# Patient Record
Sex: FEMALE | Race: WHITE | NOT HISPANIC OR LATINO | Employment: FULL TIME | URBAN - METROPOLITAN AREA
[De-identification: names, ages, dates, MRNs, and addresses within clinical notes are randomized per-mention and may not be internally consistent; named-entity substitution may affect disease eponyms.]

---

## 2024-04-21 ENCOUNTER — HOSPITAL ENCOUNTER (EMERGENCY)
Facility: HOSPITAL | Age: 49
Discharge: HOME | End: 2024-04-21
Payer: COMMERCIAL

## 2024-04-21 ENCOUNTER — APPOINTMENT (OUTPATIENT)
Dept: RADIOLOGY | Facility: HOSPITAL | Age: 49
End: 2024-04-21
Payer: COMMERCIAL

## 2024-04-21 VITALS
BODY MASS INDEX: 50.02 KG/M2 | HEART RATE: 85 BPM | HEIGHT: 64 IN | OXYGEN SATURATION: 99 % | RESPIRATION RATE: 18 BRPM | TEMPERATURE: 98.6 F | DIASTOLIC BLOOD PRESSURE: 82 MMHG | WEIGHT: 293 LBS | SYSTOLIC BLOOD PRESSURE: 126 MMHG

## 2024-04-21 DIAGNOSIS — M25.562 ACUTE PAIN OF LEFT KNEE: Primary | ICD-10-CM

## 2024-04-21 PROCEDURE — 99283 EMERGENCY DEPT VISIT LOW MDM: CPT

## 2024-04-21 PROCEDURE — 73564 X-RAY EXAM KNEE 4 OR MORE: CPT | Mod: LT

## 2024-04-21 PROCEDURE — 73564 X-RAY EXAM KNEE 4 OR MORE: CPT | Mod: LEFT SIDE | Performed by: STUDENT IN AN ORGANIZED HEALTH CARE EDUCATION/TRAINING PROGRAM

## 2024-04-21 PROCEDURE — 2500000001 HC RX 250 WO HCPCS SELF ADMINISTERED DRUGS (ALT 637 FOR MEDICARE OP): Performed by: NURSE PRACTITIONER

## 2024-04-21 RX ORDER — KETOROLAC TROMETHAMINE 10 MG/1
10 TABLET, FILM COATED ORAL EVERY 6 HOURS PRN
Qty: 20 TABLET | Refills: 0 | Status: SHIPPED | OUTPATIENT
Start: 2024-04-21 | End: 2024-04-26

## 2024-04-21 RX ORDER — HYDROCODONE BITARTRATE AND ACETAMINOPHEN 5; 325 MG/1; MG/1
1 TABLET ORAL ONCE
Status: COMPLETED | OUTPATIENT
Start: 2024-04-21 | End: 2024-04-21

## 2024-04-21 RX ADMIN — HYDROCODONE BITARTRATE AND ACETAMINOPHEN 1 TABLET: 5; 325 TABLET ORAL at 21:43

## 2024-04-21 ASSESSMENT — PAIN SCALES - GENERAL
PAINLEVEL_OUTOF10: 8
PAINLEVEL_OUTOF10: 8

## 2024-04-21 ASSESSMENT — COLUMBIA-SUICIDE SEVERITY RATING SCALE - C-SSRS
1. IN THE PAST MONTH, HAVE YOU WISHED YOU WERE DEAD OR WISHED YOU COULD GO TO SLEEP AND NOT WAKE UP?: NO
6. HAVE YOU EVER DONE ANYTHING, STARTED TO DO ANYTHING, OR PREPARED TO DO ANYTHING TO END YOUR LIFE?: NO
2. HAVE YOU ACTUALLY HAD ANY THOUGHTS OF KILLING YOURSELF?: NO

## 2024-04-21 ASSESSMENT — PAIN DESCRIPTION - LOCATION: LOCATION: KNEE

## 2024-04-21 ASSESSMENT — PAIN - FUNCTIONAL ASSESSMENT
PAIN_FUNCTIONAL_ASSESSMENT: 0-10
PAIN_FUNCTIONAL_ASSESSMENT: 0-10

## 2024-04-22 NOTE — ED PROVIDER NOTES
HPI   Chief Complaint   Patient presents with    Knee Pain       This is a 49-year-old  female presenting to the emergency room with complaints of left knee pain.  The patient reports that she has had previous knee problems.  She was told she was bone-on-bone in both knees.  Her knees frequently give out.  The patient was walking downstairs yesterday and her right knee gave out.  She slammed down on her left leg trying to catch herself.  She is experiencing pain mostly in the medial tibial plateau region.  She states that it shoots straight through to the posterior aspect.  She reports swelling along the medial joint line.  She has pain with extension and flexion of the knee.  It is very hard for her to bear weight.  No calf pain.  No paresthesias.  She took Motrin this morning with no relief of her pain symptoms.  She is under the care of comprehensive pain management but is currently not on any pain medications.                          [place] Coma Scale Score: 15                     Patient History   Past Medical History:   Diagnosis Date    Anxiety     Depression      History reviewed. No pertinent surgical history.  No family history on file.  Social History     Tobacco Use    Smoking status: Never    Smokeless tobacco: Never   Substance Use Topics    Alcohol use: Not Currently     Comment: occassionally    Drug use: Never       Physical Exam   ED Triage Vitals [04/21/24 2121]   Temperature Heart Rate Respirations BP   37 °C (98.6 °F) 85 18 126/82      Pulse Ox Temp Source Heart Rate Source Patient Position   99 % Temporal Monitor --      BP Location FiO2 (%)     -- --       Physical Exam    ED Course & MDM   Diagnoses as of 04/21/24 2225   Acute pain of left knee       Medical Decision Making  The patient was seen and evaluated by the nurse practitioner, Denita Pena.  The patient is presenting to the emergency room with acute on chronic left knee pain.  Differential diagnosis includes knee strain,  knee dislocation, fracture, ligamentous injury, or other acute process.  The patient does not have any obvious signs of a septic joint.  An x-ray of the knee was performed and showed moderate knee joint arthropathy with no acute evidence of fracture or dislocation.  Patient was medicated with 1 Norco tab p.o. in the emergency room.  An Ace wrap was placed on the knee by nursing staff.  She is neurovascularly intact after Ace wrap placement.  She was educated on rice therapy and is to follow-up with orthopedic specialist.  Patient was provided prescription for ketorolac for home administration.  The patient was discharged in stable condition with computer discharge instructions given.  She is to return if worse in any way.        Procedure  Procedures     Denita Pena, SHAMIKA-VERONIQUE  04/21/24 2629

## 2024-04-22 NOTE — ED TRIAGE NOTES
"Pt to ED from home c/o L knee pain. Pt states both knees \"bone on bone\", has had multiple knee surgeries in the past. Pt states was walking down steps when she missed one, landed on L foot hard. Pt states normally about 90% non-weight bearing due to pain bilateral knees, pt states utilized crutches at home to ambulate. Pt denies hitting the floor during fall, only c/o L knee pain.   "

## 2024-05-28 ENCOUNTER — HOSPITAL ENCOUNTER (EMERGENCY)
Facility: HOSPITAL | Age: 49
Discharge: HOME | End: 2024-05-28
Payer: COMMERCIAL

## 2024-05-28 VITALS
BODY MASS INDEX: 50.02 KG/M2 | DIASTOLIC BLOOD PRESSURE: 83 MMHG | HEIGHT: 64 IN | TEMPERATURE: 98.1 F | WEIGHT: 293 LBS | HEART RATE: 98 BPM | RESPIRATION RATE: 18 BRPM | OXYGEN SATURATION: 100 % | SYSTOLIC BLOOD PRESSURE: 132 MMHG

## 2024-05-28 DIAGNOSIS — F41.0 PANIC ATTACK: Primary | ICD-10-CM

## 2024-05-28 PROCEDURE — 2500000001 HC RX 250 WO HCPCS SELF ADMINISTERED DRUGS (ALT 637 FOR MEDICARE OP): Performed by: NURSE PRACTITIONER

## 2024-05-28 PROCEDURE — 99283 EMERGENCY DEPT VISIT LOW MDM: CPT

## 2024-05-28 RX ORDER — ALPRAZOLAM 0.5 MG/1
0.5 TABLET ORAL ONCE
Status: COMPLETED | OUTPATIENT
Start: 2024-05-28 | End: 2024-05-28

## 2024-05-28 RX ADMIN — ALPRAZOLAM 0.5 MG: 0.5 TABLET ORAL at 12:14

## 2024-05-28 ASSESSMENT — COLUMBIA-SUICIDE SEVERITY RATING SCALE - C-SSRS
2. HAVE YOU ACTUALLY HAD ANY THOUGHTS OF KILLING YOURSELF?: NO
6. HAVE YOU EVER DONE ANYTHING, STARTED TO DO ANYTHING, OR PREPARED TO DO ANYTHING TO END YOUR LIFE?: NO
1. IN THE PAST MONTH, HAVE YOU WISHED YOU WERE DEAD OR WISHED YOU COULD GO TO SLEEP AND NOT WAKE UP?: NO

## 2024-05-28 ASSESSMENT — PAIN DESCRIPTION - LOCATION: LOCATION: HEAD

## 2024-05-28 ASSESSMENT — PAIN - FUNCTIONAL ASSESSMENT: PAIN_FUNCTIONAL_ASSESSMENT: 0-10

## 2024-05-28 ASSESSMENT — PAIN SCALES - GENERAL: PAINLEVEL_OUTOF10: 4

## 2024-05-28 NOTE — ED PROVIDER NOTES
Chief Complaint   Patient presents with   • Hyperventilating     Pt was driving and had a panic attack. Pt has hands cramping and face tingling. Pt called ems. Pt states she currently has a lot of stress.        HPI       49 year old female presents to the Emergency Department today complaining of having an anxiety attack while driving this am. Notes that she pulled over to the side of the road and called EMS. Was noted to have carpal pedal spasms secondary to such. Denies any associated fever, chills, headache, neck pain, chest pain, shortness of breath, abdominal pain, nausea, vomiting, diarrhea, constipation, or urinary symptoms.       History provided by:  Patient             Patient History   Past Medical History:   Diagnosis Date   • Anxiety    • Depression      No past surgical history on file.  No family history on file.  Social History     Tobacco Use   • Smoking status: Never   • Smokeless tobacco: Never   Substance Use Topics   • Alcohol use: Not Currently     Comment: occassionally   • Drug use: Never           Physical Exam  Constitutional:       Appearance: Normal appearance.   HENT:      Head: Normocephalic.      Right Ear: Tympanic membrane, ear canal and external ear normal.      Left Ear: Tympanic membrane, ear canal and external ear normal.      Nose: Nose normal.      Mouth/Throat:      Mouth: Mucous membranes are moist.      Pharynx: Oropharynx is clear. No oropharyngeal exudate or posterior oropharyngeal erythema.   Eyes:      Conjunctiva/sclera: Conjunctivae normal.      Pupils: Pupils are equal, round, and reactive to light.   Cardiovascular:      Rate and Rhythm: Normal rate and regular rhythm.      Pulses:           Radial pulses are 3+ on the right side and 3+ on the left side.        Dorsalis pedis pulses are 3+ on the right side and 3+ on the left side.      Heart sounds: Normal heart sounds. No murmur heard.     No friction rub. No gallop.   Pulmonary:      Effort: Pulmonary effort is  normal. No respiratory distress.      Breath sounds: Normal breath sounds. No wheezing, rhonchi or rales.   Abdominal:      General: Abdomen is flat. Bowel sounds are normal.      Palpations: Abdomen is soft.      Tenderness: There is no abdominal tenderness. There is no right CVA tenderness, left CVA tenderness, guarding or rebound. Negative signs include Grover's sign and McBurney's sign.   Musculoskeletal:         General: No swelling or deformity.      Cervical back: Full passive range of motion without pain.      Right lower leg: No edema.      Left lower leg: No edema.      Comments: Carpal pedal spasms present.    Lymphadenopathy:      Cervical: No cervical adenopathy.   Skin:     Capillary Refill: Capillary refill takes less than 2 seconds.      Coloration: Skin is not jaundiced.      Findings: No rash.   Neurological:      General: No focal deficit present.      Mental Status: She is alert and oriented to person, place, and time. Mental status is at baseline.      Gait: Gait is intact.   Psychiatric:         Mood and Affect: Mood normal.         Behavior: Behavior is cooperative.         Labs Reviewed - No data to display    No orders to display            ED Course & MDM   Diagnoses as of 05/28/24 1307   Panic attack           Medical Decision Making  Patient was seen and evaluated by myself. Placed on a non-rebreather with minimal oxygen. Gave her a dose of her Xanax as her home dose. She was monitored for a small amount of time in the ED with resolution of her symptoms. Notes that she has been under a great deal of stress as of late, which is likely the cause of her panic attack.  Follow up with their doctor in 3 days. Return if worse in any way. Discharged in stable condition with computer instructions.    Diagnostic Impression:     1. Acute panic attack           Your medication list      You have not been prescribed any medications.           Procedure  Procedures     Willie Chao,  SHAMIKA-CNP  05/28/24 1689

## 2025-01-03 ENCOUNTER — APPOINTMENT (OUTPATIENT)
Dept: RADIOLOGY | Facility: HOSPITAL | Age: 50
End: 2025-01-03
Payer: COMMERCIAL

## 2025-01-03 ENCOUNTER — HOSPITAL ENCOUNTER (EMERGENCY)
Facility: HOSPITAL | Age: 50
Discharge: HOME | End: 2025-01-03
Attending: EMERGENCY MEDICINE
Payer: COMMERCIAL

## 2025-01-03 VITALS
BODY MASS INDEX: 50.02 KG/M2 | WEIGHT: 293 LBS | SYSTOLIC BLOOD PRESSURE: 135 MMHG | HEART RATE: 96 BPM | RESPIRATION RATE: 18 BRPM | OXYGEN SATURATION: 96 % | TEMPERATURE: 97 F | HEIGHT: 64 IN | DIASTOLIC BLOOD PRESSURE: 90 MMHG

## 2025-01-03 DIAGNOSIS — K52.9 ENTEROCOLITIS: Primary | ICD-10-CM

## 2025-01-03 LAB
ALBUMIN SERPL BCP-MCNC: 4.5 G/DL (ref 3.4–5)
ALP SERPL-CCNC: 148 U/L (ref 33–110)
ALT SERPL W P-5'-P-CCNC: 83 U/L (ref 7–45)
ANION GAP SERPL CALC-SCNC: 13 MMOL/L (ref 10–20)
AST SERPL W P-5'-P-CCNC: 68 U/L (ref 9–39)
BASOPHILS # BLD AUTO: 0.02 X10*3/UL (ref 0–0.1)
BASOPHILS NFR BLD AUTO: 0.2 %
BILIRUB SERPL-MCNC: 0.8 MG/DL (ref 0–1.2)
BUN SERPL-MCNC: 18 MG/DL (ref 6–23)
CALCIUM SERPL-MCNC: 8.8 MG/DL (ref 8.6–10.3)
CHLORIDE SERPL-SCNC: 105 MMOL/L (ref 98–107)
CO2 SERPL-SCNC: 24 MMOL/L (ref 21–32)
CREAT SERPL-MCNC: 0.83 MG/DL (ref 0.5–1.05)
EGFRCR SERPLBLD CKD-EPI 2021: 85 ML/MIN/1.73M*2
EOSINOPHIL # BLD AUTO: 0.07 X10*3/UL (ref 0–0.7)
EOSINOPHIL NFR BLD AUTO: 0.8 %
ERYTHROCYTE [DISTWIDTH] IN BLOOD BY AUTOMATED COUNT: 14.7 % (ref 11.5–14.5)
GLUCOSE SERPL-MCNC: 132 MG/DL (ref 74–99)
HCT VFR BLD AUTO: 40.6 % (ref 36–46)
HGB BLD-MCNC: 12.7 G/DL (ref 12–16)
IMM GRANULOCYTES # BLD AUTO: 0.04 X10*3/UL (ref 0–0.7)
IMM GRANULOCYTES NFR BLD AUTO: 0.4 % (ref 0–0.9)
LACTATE SERPL-SCNC: 2.3 MMOL/L (ref 0.4–2)
LYMPHOCYTES # BLD AUTO: 0.65 X10*3/UL (ref 1.2–4.8)
LYMPHOCYTES NFR BLD AUTO: 7.1 %
MCH RBC QN AUTO: 26.9 PG (ref 26–34)
MCHC RBC AUTO-ENTMCNC: 31.3 G/DL (ref 32–36)
MCV RBC AUTO: 86 FL (ref 80–100)
MONOCYTES # BLD AUTO: 0.43 X10*3/UL (ref 0.1–1)
MONOCYTES NFR BLD AUTO: 4.7 %
NEUTROPHILS # BLD AUTO: 7.9 X10*3/UL (ref 1.2–7.7)
NEUTROPHILS NFR BLD AUTO: 86.8 %
NRBC BLD-RTO: 0 /100 WBCS (ref 0–0)
PLATELET # BLD AUTO: 215 X10*3/UL (ref 150–450)
POTASSIUM SERPL-SCNC: 3.5 MMOL/L (ref 3.5–5.3)
PROT SERPL-MCNC: 7.6 G/DL (ref 6.4–8.2)
RBC # BLD AUTO: 4.72 X10*6/UL (ref 4–5.2)
SODIUM SERPL-SCNC: 138 MMOL/L (ref 136–145)
WBC # BLD AUTO: 9.1 X10*3/UL (ref 4.4–11.3)

## 2025-01-03 PROCEDURE — 2500000004 HC RX 250 GENERAL PHARMACY W/ HCPCS (ALT 636 FOR OP/ED)

## 2025-01-03 PROCEDURE — 85025 COMPLETE CBC W/AUTO DIFF WBC: CPT | Performed by: EMERGENCY MEDICINE

## 2025-01-03 PROCEDURE — 96374 THER/PROPH/DIAG INJ IV PUSH: CPT | Mod: 59

## 2025-01-03 PROCEDURE — 80053 COMPREHEN METABOLIC PANEL: CPT | Performed by: EMERGENCY MEDICINE

## 2025-01-03 PROCEDURE — 74177 CT ABD & PELVIS W/CONTRAST: CPT

## 2025-01-03 PROCEDURE — 2550000001 HC RX 255 CONTRASTS: Performed by: EMERGENCY MEDICINE

## 2025-01-03 PROCEDURE — 2500000004 HC RX 250 GENERAL PHARMACY W/ HCPCS (ALT 636 FOR OP/ED): Performed by: EMERGENCY MEDICINE

## 2025-01-03 PROCEDURE — 96375 TX/PRO/DX INJ NEW DRUG ADDON: CPT

## 2025-01-03 PROCEDURE — 36415 COLL VENOUS BLD VENIPUNCTURE: CPT | Performed by: EMERGENCY MEDICINE

## 2025-01-03 PROCEDURE — 99285 EMERGENCY DEPT VISIT HI MDM: CPT | Mod: 25 | Performed by: EMERGENCY MEDICINE

## 2025-01-03 PROCEDURE — 96361 HYDRATE IV INFUSION ADD-ON: CPT

## 2025-01-03 PROCEDURE — 74177 CT ABD & PELVIS W/CONTRAST: CPT | Performed by: RADIOLOGY

## 2025-01-03 PROCEDURE — 83605 ASSAY OF LACTIC ACID: CPT | Performed by: EMERGENCY MEDICINE

## 2025-01-03 RX ORDER — ONDANSETRON HYDROCHLORIDE 2 MG/ML
4 INJECTION, SOLUTION INTRAVENOUS ONCE
Status: COMPLETED | OUTPATIENT
Start: 2025-01-03 | End: 2025-01-03

## 2025-01-03 RX ORDER — ONDANSETRON HYDROCHLORIDE 2 MG/ML
INJECTION, SOLUTION INTRAVENOUS
Status: COMPLETED
Start: 2025-01-03 | End: 2025-01-03

## 2025-01-03 RX ORDER — ONDANSETRON 4 MG/1
4 TABLET, ORALLY DISINTEGRATING ORAL EVERY 8 HOURS PRN
Qty: 20 TABLET | Refills: 0 | Status: SHIPPED | OUTPATIENT
Start: 2025-01-03 | End: 2025-01-10

## 2025-01-03 RX ORDER — MORPHINE SULFATE 4 MG/ML
INJECTION INTRAVENOUS
Status: COMPLETED
Start: 2025-01-03 | End: 2025-01-03

## 2025-01-03 RX ORDER — DICYCLOMINE HYDROCHLORIDE 20 MG/1
20 TABLET ORAL 4 TIMES DAILY PRN
Qty: 28 TABLET | Refills: 0 | Status: SHIPPED | OUTPATIENT
Start: 2025-01-03 | End: 2025-01-10

## 2025-01-03 RX ORDER — MORPHINE SULFATE 4 MG/ML
4 INJECTION INTRAVENOUS ONCE
Status: COMPLETED | OUTPATIENT
Start: 2025-01-03 | End: 2025-01-03

## 2025-01-03 RX ADMIN — ONDANSETRON HYDROCHLORIDE 4 MG: 2 INJECTION, SOLUTION INTRAVENOUS at 04:31

## 2025-01-03 RX ADMIN — MORPHINE SULFATE 4 MG: 4 INJECTION INTRAVENOUS at 04:31

## 2025-01-03 RX ADMIN — ONDANSETRON 4 MG: 2 INJECTION INTRAMUSCULAR; INTRAVENOUS at 04:31

## 2025-01-03 RX ADMIN — SODIUM CHLORIDE 1000 ML: 9 INJECTION, SOLUTION INTRAVENOUS at 04:31

## 2025-01-03 RX ADMIN — IOHEXOL 75 ML: 350 INJECTION, SOLUTION INTRAVENOUS at 05:25

## 2025-01-03 ASSESSMENT — PAIN SCALES - GENERAL
PAINLEVEL_OUTOF10: 8
PAINLEVEL_OUTOF10: 4

## 2025-01-03 ASSESSMENT — PAIN DESCRIPTION - LOCATION: LOCATION: ABDOMEN

## 2025-01-03 ASSESSMENT — COLUMBIA-SUICIDE SEVERITY RATING SCALE - C-SSRS
1. IN THE PAST MONTH, HAVE YOU WISHED YOU WERE DEAD OR WISHED YOU COULD GO TO SLEEP AND NOT WAKE UP?: NO
2. HAVE YOU ACTUALLY HAD ANY THOUGHTS OF KILLING YOURSELF?: NO
6. HAVE YOU EVER DONE ANYTHING, STARTED TO DO ANYTHING, OR PREPARED TO DO ANYTHING TO END YOUR LIFE?: NO

## 2025-01-03 ASSESSMENT — PAIN - FUNCTIONAL ASSESSMENT
PAIN_FUNCTIONAL_ASSESSMENT: 0-10
PAIN_FUNCTIONAL_ASSESSMENT: 0-10

## 2025-01-03 ASSESSMENT — PAIN DESCRIPTION - PAIN TYPE: TYPE: ACUTE PAIN

## 2025-01-03 NOTE — ED PROVIDER NOTES
HPI   Chief Complaint   Patient presents with    Abdominal Pain     Pt c/o abd pain, nausea and vomiting x a few hours       Patient presents emergency department secondary to abdominal pain nausea and vomiting.  This started a few hours ago.  She also is having diarrhea.  No hematemesis or hematochezia.  Pain is more localized in the upper abdomen but really diffuse over the whole stomach.  No sick contacts.  No bad food exposures.  No history of pain like this in the past.  Patient has not had any abdominal surgeries.  No fever.  She has had chills.  No chest pain or shortness of breath.  No cough or congestion.  No other complaints at this time.                          [place] Coma Scale Score: 15                  Patient History   Past Medical History:   Diagnosis Date    Anxiety     Depression      History reviewed. No pertinent surgical history.  No family history on file.  Social History     Tobacco Use    Smoking status: Never    Smokeless tobacco: Never   Vaping Use    Vaping status: Never Used   Substance Use Topics    Alcohol use: Not Currently     Comment: occassionally    Drug use: Never       Physical Exam   ED Triage Vitals [01/03/25 0326]   Temperature Heart Rate Respirations BP   36.1 °C (97 °F) 97 (!) 24 (!) 144/94      Pulse Ox Temp Source Heart Rate Source Patient Position   96 % Skin Monitor --      BP Location FiO2 (%)     -- --       Physical Exam  Vitals and nursing note reviewed.   Constitutional:       Appearance: Normal appearance. She is well-developed. She is ill-appearing.   HENT:      Head: Normocephalic and atraumatic.      Right Ear: Tympanic membrane normal.      Left Ear: Tympanic membrane normal.      Nose: Nose normal.      Mouth/Throat:      Mouth: Mucous membranes are moist.   Eyes:      Extraocular Movements: Extraocular movements intact.      Pupils: Pupils are equal, round, and reactive to light.   Cardiovascular:      Rate and Rhythm: Normal rate and regular rhythm.       Pulses: Normal pulses.      Heart sounds: Normal heart sounds.   Pulmonary:      Effort: Pulmonary effort is normal.      Breath sounds: Normal breath sounds. No wheezing or rales.   Chest:      Chest wall: No tenderness.   Abdominal:      General: Abdomen is flat. Bowel sounds are normal.      Palpations: Abdomen is soft.      Tenderness: There is generalized abdominal tenderness.      Hernia: No hernia is present.      Comments: Voluntary guarding in the upper abdomen.   Musculoskeletal:         General: Normal range of motion.   Skin:     General: Skin is warm and dry.      Capillary Refill: Capillary refill takes less than 2 seconds.   Neurological:      General: No focal deficit present.      Mental Status: She is alert and oriented to person, place, and time.   Psychiatric:         Mood and Affect: Mood normal.         Behavior: Behavior normal.       Labs Reviewed   URINALYSIS WITH REFLEX CULTURE AND MICROSCOPIC    Narrative:     The following orders were created for panel order Urinalysis with Reflex Culture and Microscopic.  Procedure                               Abnormality         Status                     ---------                               -----------         ------                     Urinalysis with Reflex C...[[id number]]                                                 Extra Urine Gray Tube[[id number]]                                                         Please view results for these tests on the individual orders.   CBC WITH AUTO DIFFERENTIAL   COMPREHENSIVE METABOLIC PANEL   LACTATE   URINALYSIS WITH REFLEX CULTURE AND MICROSCOPIC   EXTRA URINE GRAY TUBE     Pain Management Panel           No data to display              CT abdomen pelvis w IV contrast    (Results Pending)     ED Course & MDM   Diagnoses as of 01/03/25 0627   Enterocolitis       Medical Decision Making  Patient presents to the emergency department secondary to abdominal pain nausea vomiting and diarrhea.  Differential  diagnosis for this patient is gastroenteritis, colitis, appendicitis, other acute intra-abdominal process.  Patient is evaluated in the emergency department CBC CMP and urinalysis.  Lactate is obtained.  Patient is given morphine 4 mg and Zofran 4 mg for pain and nausea.  She is given 1 L normal saline IV fluid bolus.  CT abdomen pelvis with IV contrast is obtained.  Laboratory workup overall is unremarkable.  Very minimal elevation in liver enzymes.  No evidence of leukocytosis or acute kidney injury.  Lactate was mildly elevated at 2.3.  Patient feels improved after treatment.  CT abdomen pelvis shows evidence of intra colitis.  Patient is prescribed Bentyl and Zofran to use for symptomatic treatment at home.  She is to return for new or worsening symptoms.  She is stable for discharge at this time.        Procedure  Procedures     Susy Francois MD  01/03/25 0171

## 2025-02-25 ENCOUNTER — HOSPITAL ENCOUNTER (OUTPATIENT)
Dept: RADIOLOGY | Facility: CLINIC | Age: 50
Discharge: HOME | End: 2025-02-25
Payer: COMMERCIAL

## 2025-02-25 ENCOUNTER — OFFICE VISIT (OUTPATIENT)
Dept: ORTHOPEDIC SURGERY | Facility: CLINIC | Age: 50
End: 2025-02-25
Payer: COMMERCIAL

## 2025-02-25 VITALS — BODY MASS INDEX: 49.51 KG/M2 | WEIGHT: 290 LBS | HEIGHT: 64 IN

## 2025-02-25 DIAGNOSIS — G89.29 CHRONIC PAIN OF LEFT KNEE: ICD-10-CM

## 2025-02-25 DIAGNOSIS — G89.29 CHRONIC PAIN OF RIGHT KNEE: Primary | ICD-10-CM

## 2025-02-25 DIAGNOSIS — M25.562 CHRONIC PAIN OF LEFT KNEE: ICD-10-CM

## 2025-02-25 DIAGNOSIS — M25.561 CHRONIC PAIN OF RIGHT KNEE: Primary | ICD-10-CM

## 2025-02-25 DIAGNOSIS — G89.29 CHRONIC PAIN OF RIGHT KNEE: ICD-10-CM

## 2025-02-25 DIAGNOSIS — M25.561 CHRONIC PAIN OF RIGHT KNEE: ICD-10-CM

## 2025-02-25 PROCEDURE — 3008F BODY MASS INDEX DOCD: CPT | Performed by: STUDENT IN AN ORGANIZED HEALTH CARE EDUCATION/TRAINING PROGRAM

## 2025-02-25 PROCEDURE — 99204 OFFICE O/P NEW MOD 45 MIN: CPT | Performed by: STUDENT IN AN ORGANIZED HEALTH CARE EDUCATION/TRAINING PROGRAM

## 2025-02-25 PROCEDURE — 99214 OFFICE O/P EST MOD 30 MIN: CPT | Performed by: STUDENT IN AN ORGANIZED HEALTH CARE EDUCATION/TRAINING PROGRAM

## 2025-02-25 PROCEDURE — 73564 X-RAY EXAM KNEE 4 OR MORE: CPT | Mod: 50

## 2025-02-25 ASSESSMENT — PAIN - FUNCTIONAL ASSESSMENT: PAIN_FUNCTIONAL_ASSESSMENT: 0-10

## 2025-02-25 ASSESSMENT — PAIN DESCRIPTION - DESCRIPTORS: DESCRIPTORS: ACHING

## 2025-02-25 ASSESSMENT — PAIN SCALES - GENERAL: PAINLEVEL_OUTOF10: 8

## 2025-02-25 NOTE — PROGRESS NOTES
PENNY/TKA Related Summary           L hip: N  L knee  1990: Arthroscopic debridement (Dr. Avinash Roland at ?)  2019: Arthroscopic debridement (Dr. Heredia at ?)  R hip: N  R knee  2024: Arthroscopic debridement (Dr. Donovan at [facility])  Lumbar surgery or significant pathology: N            CC/SUBJECTIVE/HPI            Referring provider: No ref. provider found  Julissa Vazquez is a 50 y.o. female presenting for bilateral knee pain.  She has followed at a neighboring hospital for a variety of conservative treatments as listed below.    L knee  Symptoms  Pain: 6/10  Onset: chronic/gradual  Duration: >2yrs  Location: all over  Quality: dull/ache, sharp/stab, and catch/lock  Limitations: morning stiffness, pain after activity, limp, weakness, feeling unstable, night pain, difficulty with stairs, difficulty in/out of chair/car, and difficulty with socks/shoes/clothes  Ambulation limit due to pain: 100-500ft  Other symptoms: none  Treatment  Tried: activity modification, bracing, weight loss, PT, home exercises, ice/heat, topical gel (ie Voltaren), OTCs, narcotics, and CSI  Most recent injection <3mo ago? N  Assistive device: cane  Treatment attempted for >2yrs and is no longer effective    R knee  Symptoms  Pain: 6/10  Onset: chronic/gradual  Duration: >2yrs  Location: all over  Quality: dull/ache, sharp/stab, and catch/lock  Limitations: morning stiffness, pain after activity, limp, weakness, feeling unstable, night pain, difficulty with stairs, difficulty in/out of chair/car, and difficulty with socks/shoes/clothes  Ambulation limit due to pain: 100-500ft  Other symptoms: none  Treatment  Tried: activity modification, bracing, weight loss, PT, home exercises, ice/heat, topical gel (ie Voltaren), OTCs, narcotics, and CSI  Most recent injection <3mo ago? N  Assistive device: cane  Treatment attempted for >2yrs and is no longer effective            HISTORIES (System Generated and Pt Reported on Form  "Today)       Dental  Pt reported: No active issues     PMH  Pt reported: Denies heart/lung/kidney/liver issues, DM, stroke, seizure, bariatric surgery, anticoag, MRSA, cancer, personal/familial coagulopathies except: none in addition to below  System generated (PMH, problem list both included since EMR change caused discrepancies):   Past Medical History:   Diagnosis Date    Anxiety     Depression     There is no problem list on file for this patient.    PSH  Pt reported: Per above.   System generated: No past surgical history on file.    Family Hx  Pt reported clot/coagulopathies: none    Social Hx  Pt reported substance use: per below  System generated:   Social History     Tobacco Use    Smoking status: Never    Smokeless tobacco: Never   Vaping Use    Vaping status: Never Used   Substance Use Topics    Alcohol use: Not Currently     Comment: occassionally    Drug use: Never     Allergies  Pt reported (meds, metals): per below  System generated:   Allergies   Allergen Reactions    Codeine Other     Current Meds  System generated: No current outpatient medications on file.    ROS: Neg except HPI            OBJECTIVE            Physical exam  Estimated body mass index is 50.64 kg/m² as calculated from the following:    Height as of 1/3/25: 1.626 m (5' 4\").    Weight as of 1/3/25: 134 kg (295 lb).  Gen/psych: NAD, conversational, appropriate    Ambulation  Gait: antalgic  Assistive device: cane  Spine  Lumbar tenderness: neg  Limited ROM: neg, with no radiation or pain with flex/ex, lateral bending  SLR test: neg    Focused MSK exam: L  Knee  Skin/incision: normal in area of planned/possible incision (medial parapatellar approach)  Effusion: Unable to assess given habitus  Alignment: Varus  Knee tenderness: diffuse  Crepitation: mild  Extension: passive flexion contracture 5-10° and active extension lag 0°  Flexion: 95°, limited by soft tissue impingement  Anterior drawer: Unable to assess given habitus, grossly " stable  Posterior drawer: Unable to assess given habitus, grossly stable  Varus/Valgus in extension: Unable to assess given habitus, grossly stable  Varus/Valgus in flexion: Unable to assess given habitus, grossly stable  Referred pain to knee with hip 90° flexion and 45° ER/IR: neg  Neurovascular    Strength: 5/5 hip/knee/ankle flexion and extension  Sensory (L2-S1): SILT throughout lower extremity  Edema/stasis: no pitting edema  Pulse: DP 1+, PT 1+    Focused MSK exam: R  Knee  Skin/incision: normal in area of planned/possible incision (medial parapatellar approach)  Effusion: Unable to assess given habitus  Alignment: Varus  Knee tenderness: diffuse  Crepitation: mild  Extension: passive flexion contracture 5-10° and active extension lag 0°  Flexion: 95°, limited by soft tissue impingement  Anterior drawer: Unable to assess given habitus, grossly stable  Posterior drawer: Unable to assess given habitus, grossly stable  Varus/Valgus in extension: Unable to assess given habitus, grossly stable  Varus/Valgus in flexion: Unable to assess given habitus, grossly stable  Referred pain to knee with hip 90° flexion and 45° ER/IR: neg  Neurovascular    Strength: 5/5 hip/knee/ankle flexion and extension  Sensory (L2-S1): SILT throughout lower extremity  Edema/stasis: no pitting edema  Pulse: DP 1+, PT 1+    Imaging  2/25/2025 L knee radiographs (Merchant, WB AP/lateral, WB AP flexion) on my read: Joint space narrowing (medial tibiofemoral severe, lateral tibiofemoral mild, patellofemoral mild) with osteophytes and sclerosis. Limb alignment varus (partially correctable).  2/25/2025 R knee radiographs ([employer]chant, WB AP/lateral, WB AP flexion) on my read: Joint space narrowing (medial tibiofemoral severe, lateral tibiofemoral mild, patellofemoral mild) with osteophytes and sclerosis. Limb alignment varus (partially correctable).            ASSESSMENT & PLAN           Patient verbalized understanding of below A&P. All questions  answered.  Bilateral knee DJD  Differential includes radicular pain from spine or referred pain from hip. H&P most consistent with knee origin.  General information  Evaluation, imaging, diagnosis, and treatment options (conservative and surgical as well as risks, benefits, recovery, and outcomes) discussed. Conservative options should be maximized and include activity modification, bracing, weight loss, PT, home exercise, local pain control (ice, heat, topicals), OTCs, and assistive devices Once exhausted, injections may provide relief. If these fail to improve pain, function, and quality of life, elective arthroplasty may be an option.  Shared decision making  Unfortunately, currently not arthroplasty candidate given BMI 50  Declined further injections, as most recent ones did not help  Follow-up: As needed.  PMH, PSH, other considerations discussed  Majority of health records are at an outside hospital and would require review to identify comorbidities prior to any surgical intervention.  BMI>40 increases surgical risks.  Higher lifetime risk of revision with index surgery at young age.  SDD candidate  Occupation: RVT  Hobbies: Raising dogs, showing  PCP: Zahra Witt MD

## 2025-04-02 ENCOUNTER — HOSPITAL ENCOUNTER (OUTPATIENT)
Dept: RADIOLOGY | Facility: HOSPITAL | Age: 50
Discharge: HOME | End: 2025-04-02
Payer: COMMERCIAL

## 2025-04-02 DIAGNOSIS — M17.0 BILATERAL PRIMARY OSTEOARTHRITIS OF KNEE: ICD-10-CM

## 2025-04-02 PROCEDURE — 77073 BONE LENGTH STUDIES: CPT | Performed by: RADIOLOGY

## 2025-04-02 PROCEDURE — 77073 BONE LENGTH STUDIES: CPT

## 2025-07-07 ENCOUNTER — PHARMACY VISIT (OUTPATIENT)
Dept: PHARMACY | Facility: CLINIC | Age: 50
End: 2025-07-07
Payer: COMMERCIAL

## 2025-07-07 ENCOUNTER — HOSPITAL ENCOUNTER (EMERGENCY)
Facility: HOSPITAL | Age: 50
Discharge: HOME | End: 2025-07-07
Attending: STUDENT IN AN ORGANIZED HEALTH CARE EDUCATION/TRAINING PROGRAM
Payer: COMMERCIAL

## 2025-07-07 ENCOUNTER — APPOINTMENT (OUTPATIENT)
Dept: RADIOLOGY | Facility: HOSPITAL | Age: 50
End: 2025-07-07
Payer: COMMERCIAL

## 2025-07-07 VITALS
RESPIRATION RATE: 16 BRPM | DIASTOLIC BLOOD PRESSURE: 84 MMHG | OXYGEN SATURATION: 100 % | TEMPERATURE: 98.7 F | BODY MASS INDEX: 50.02 KG/M2 | WEIGHT: 293 LBS | HEART RATE: 98 BPM | SYSTOLIC BLOOD PRESSURE: 136 MMHG | HEIGHT: 64 IN

## 2025-07-07 DIAGNOSIS — M79.651 PAIN OF RIGHT THIGH: Primary | ICD-10-CM

## 2025-07-07 PROCEDURE — 99284 EMERGENCY DEPT VISIT MOD MDM: CPT | Performed by: STUDENT IN AN ORGANIZED HEALTH CARE EDUCATION/TRAINING PROGRAM

## 2025-07-07 PROCEDURE — 73564 X-RAY EXAM KNEE 4 OR MORE: CPT | Mod: RT

## 2025-07-07 PROCEDURE — 73502 X-RAY EXAM HIP UNI 2-3 VIEWS: CPT | Mod: RT

## 2025-07-07 PROCEDURE — 96372 THER/PROPH/DIAG INJ SC/IM: CPT | Performed by: STUDENT IN AN ORGANIZED HEALTH CARE EDUCATION/TRAINING PROGRAM

## 2025-07-07 PROCEDURE — 73552 X-RAY EXAM OF FEMUR 2/>: CPT | Mod: RIGHT SIDE | Performed by: RADIOLOGY

## 2025-07-07 PROCEDURE — RXMED WILLOW AMBULATORY MEDICATION CHARGE

## 2025-07-07 PROCEDURE — 73564 X-RAY EXAM KNEE 4 OR MORE: CPT | Mod: RIGHT SIDE | Performed by: RADIOLOGY

## 2025-07-07 PROCEDURE — 73552 X-RAY EXAM OF FEMUR 2/>: CPT | Mod: RT

## 2025-07-07 PROCEDURE — 2500000004 HC RX 250 GENERAL PHARMACY W/ HCPCS (ALT 636 FOR OP/ED): Mod: JZ | Performed by: STUDENT IN AN ORGANIZED HEALTH CARE EDUCATION/TRAINING PROGRAM

## 2025-07-07 PROCEDURE — 73502 X-RAY EXAM HIP UNI 2-3 VIEWS: CPT | Mod: RIGHT SIDE | Performed by: RADIOLOGY

## 2025-07-07 RX ORDER — HYDROCODONE BITARTRATE AND ACETAMINOPHEN 5; 325 MG/1; MG/1
1 TABLET ORAL EVERY 4 HOURS PRN
Qty: 12 TABLET | Refills: 0 | Status: SHIPPED | OUTPATIENT
Start: 2025-07-07 | End: 2025-07-10

## 2025-07-07 RX ORDER — HYDROMORPHONE HYDROCHLORIDE 1 MG/ML
1 INJECTION, SOLUTION INTRAMUSCULAR; INTRAVENOUS; SUBCUTANEOUS ONCE
Status: COMPLETED | OUTPATIENT
Start: 2025-07-07 | End: 2025-07-07

## 2025-07-07 RX ADMIN — HYDROMORPHONE HYDROCHLORIDE 1 MG: 1 INJECTION, SOLUTION INTRAMUSCULAR; INTRAVENOUS; SUBCUTANEOUS at 16:30

## 2025-07-07 ASSESSMENT — LIFESTYLE VARIABLES
EVER FELT BAD OR GUILTY ABOUT YOUR DRINKING: NO
HAVE PEOPLE ANNOYED YOU BY CRITICIZING YOUR DRINKING: NO
EVER HAD A DRINK FIRST THING IN THE MORNING TO STEADY YOUR NERVES TO GET RID OF A HANGOVER: NO
HAVE YOU EVER FELT YOU SHOULD CUT DOWN ON YOUR DRINKING: NO
TOTAL SCORE: 0

## 2025-07-07 ASSESSMENT — PAIN DESCRIPTION - LOCATION: LOCATION: KNEE

## 2025-07-07 ASSESSMENT — PAIN DESCRIPTION - ORIENTATION: ORIENTATION: RIGHT

## 2025-07-07 ASSESSMENT — PAIN - FUNCTIONAL ASSESSMENT: PAIN_FUNCTIONAL_ASSESSMENT: 0-10

## 2025-07-07 ASSESSMENT — PAIN DESCRIPTION - PROGRESSION: CLINICAL_PROGRESSION: NOT CHANGED

## 2025-07-07 ASSESSMENT — PAIN DESCRIPTION - PAIN TYPE: TYPE: ACUTE PAIN

## 2025-07-07 ASSESSMENT — PAIN SCALES - GENERAL: PAINLEVEL_OUTOF10: 4

## 2025-07-07 NOTE — ED PROVIDER NOTES
"Chief Complaint   Patient presents with   • Knee Injury       HPI       50 year old female presents to the Emergency Department today complaining of right upper leg pain status post injury that occurred this am. Notes that she had a right TKR performed 2 months age. Reports that she twisted her right leg a month ago and was instructed to rest the leg. While walking today, she tripped and feel and felt a \"pop\" in the right thigh region. Notes that the pain starts in her knee and radiates up the lateral aspect of the right leg to the thigh. Denies hitting their head, loss of consciousness, or seizure-like activity. Denies any other injuries. Notes that she has had difficulty ambulating since the injury.       History provided by:  Patient             Patient History   Medical History[1]  Surgical History[2]  Family History[3]  Social History[4]        Physical Exam  Constitutional:       General: She is awake.      Appearance: Normal appearance.   Cardiovascular:      Rate and Rhythm: Normal rate and regular rhythm.      Pulses:           Radial pulses are 3+ on the right side and 3+ on the left side.      Heart sounds: Normal heart sounds. No murmur heard.     No friction rub. No gallop.   Pulmonary:      Effort: Pulmonary effort is normal.      Breath sounds: Normal breath sounds and air entry.   Musculoskeletal:      Comments: No obvious deformity, ecchymosis, or edema noted to the right lower extremity. Noted to have tenderness noted laterally. Limited ROM related to pain. Right dorsalis pedis pulse is strong and regular. Capillary refill was within normal limits. Sensation is intact distally.    Neurological:      Mental Status: She is alert.   Psychiatric:         Behavior: Behavior is cooperative.         Labs Reviewed - No data to display    XR hip right with pelvis when performed 2 or 3 views   Final Result   1. No acute osseous about the right hip. Minimal osteophytosis about   the articulation.           "   MACRO:   None        Signed by: Opal Riddle 7/7/2025 4:40 PM   Dictation workstation:   [id number]      XR femur right 2+ views   Final Result   1. No acute osseous right femur.             MACRO:   None        Signed by: Opal Riddle 7/7/2025 4:41 PM   Dictation workstation:   [id number]      XR knee right 4+ views   Final Result   1. Unremarkable right total knee arthroplasty.        Signed by: Opal Riddle 7/7/2025 4:44 PM   Dictation workstation:   [id number]               ED Course & MDM   Diagnoses as of 07/07/25 1731   Pain of right thigh           Medical Decision Making  Patient was seen and evaluated by Dr. Parmar. Right hip, femur, and knee x-rays show no acute osseous about the right hip with minimal osteophytosis about the articulation; no acute osseous right femur; and unremarkable right total knee arthroplasty. We independently reviewed the films and concur with radiology interpretation. Given an injection of Dilaudid with improvement in her pain. Given a prescription for Norco for her pain. Has Zofran at home if needed for nausea. Notes that she has a knee immobilizer that she will wear if needed. She will speak with her orthopedic doctor in regards to further work up that may include a MRI. Follow up with their doctor in 3 days. Return if worse in any way. Discharged in stable condition with computer instructions.    Diagnostic Impression:     1. Acute right thigh pain    2. Subcutaneous injection    3. Prescription therapy              Your medication list      You have not been prescribed any medications.           Procedure  Procedures         [1]  Past Medical History:  Diagnosis Date   • Anxiety    • Depression    [2]  No past surgical history on file.  [3]  No family history on file.  [4]  Social History  Tobacco Use   • Smoking status: Never   • Smokeless tobacco: Never   Vaping Use   • Vaping status: Never Used   Substance Use Topics   • Alcohol use: Not Currently      Comment: occassionally   • Drug use: Never      Willie Chao, SHAMIKA-CNP  07/07/25 8406

## 2025-07-07 NOTE — ED TRIAGE NOTES
Pt presents for a fall and right knee pain. She states that she is post op total knee replacement 2 months ago. She states that she has pain clear up into her right quad. She tripped and fell. Denies any dizziness. Denies thinners. No LOC. Did not hit head.

## 2025-08-02 ENCOUNTER — APPOINTMENT (OUTPATIENT)
Dept: RADIOLOGY | Facility: HOSPITAL | Age: 50
End: 2025-08-02
Payer: COMMERCIAL